# Patient Record
Sex: FEMALE | Race: WHITE | NOT HISPANIC OR LATINO | URBAN - METROPOLITAN AREA
[De-identification: names, ages, dates, MRNs, and addresses within clinical notes are randomized per-mention and may not be internally consistent; named-entity substitution may affect disease eponyms.]

---

## 2020-09-08 ENCOUNTER — CLAIMS CREATED FROM THE CLAIM WINDOW (OUTPATIENT)
Dept: URBAN - METROPOLITAN AREA CLINIC 94 | Facility: CLINIC | Age: 30
End: 2020-09-08
Payer: COMMERCIAL

## 2020-09-08 ENCOUNTER — LAB OUTSIDE AN ENCOUNTER (OUTPATIENT)
Dept: URBAN - METROPOLITAN AREA CLINIC 94 | Facility: CLINIC | Age: 30
End: 2020-09-08

## 2020-09-08 ENCOUNTER — WEB ENCOUNTER (OUTPATIENT)
Dept: URBAN - METROPOLITAN AREA CLINIC 94 | Facility: CLINIC | Age: 30
End: 2020-09-08

## 2020-09-08 ENCOUNTER — DASHBOARD ENCOUNTERS (OUTPATIENT)
Age: 30
End: 2020-09-08

## 2020-09-08 DIAGNOSIS — K51.90 ULCERATIVE COLITIS: ICD-10-CM

## 2020-09-08 PROCEDURE — G8427 DOCREV CUR MEDS BY ELIG CLIN: HCPCS | Performed by: INTERNAL MEDICINE

## 2020-09-08 PROCEDURE — 99213 OFFICE O/P EST LOW 20 MIN: CPT | Performed by: INTERNAL MEDICINE

## 2020-09-08 PROCEDURE — G8420 CALC BMI NORM PARAMETERS: HCPCS | Performed by: INTERNAL MEDICINE

## 2020-09-08 PROCEDURE — G9903 PT SCRN TBCO ID AS NON USER: HCPCS | Performed by: INTERNAL MEDICINE

## 2020-09-08 RX ORDER — MESALAMINE 1.2 G/1
2 TABLETS TABLET, DELAYED RELEASE ORAL ONCE A DAY
Qty: 180 TABLET | Refills: 3
Start: 2019-08-12 | End: 2020-08-06

## 2020-09-08 RX ORDER — MERCAPTOPURINE 50 MG/1
TAKE 1 TABLET BY ORAL ROUTE DAILY FOR 90 DAYS TABLET ORAL 1
Qty: 90 | Refills: 3 | Status: ACTIVE | COMMUNITY
Start: 2019-09-19 | End: 2020-09-13

## 2020-09-08 RX ORDER — ERGOCALCIFEROL 1.25 MG/1
CAPSULE ORAL
Qty: 0 | Refills: 0 | Status: ACTIVE | COMMUNITY
Start: 1900-01-01

## 2020-09-08 RX ORDER — MERCAPTOPURINE 50 MG/1
TAKE 1 TABLET BY ORAL ROUTE DAILY FOR 90 DAYS TABLET ORAL 1
Qty: 90 | Refills: 3
Start: 2019-09-19

## 2020-09-08 RX ORDER — MESALAMINE 1.2 G/1
TAKE 4 TABLETS BY ORAL ROUTE DAILY FOR 90 DAYS TABLET, DELAYED RELEASE ORAL 1
Qty: 360 | Refills: 4 | Status: ACTIVE | COMMUNITY
Start: 2019-08-12 | End: 2020-11-04

## 2020-09-08 NOTE — HPI-TODAY'S VISIT:
Pt has chronic UC. UC in remission on lialda and 6MP. Pt denies any GI symptoms such as diarrhea, rectal bleeding or abdominal pain.

## 2020-09-08 NOTE — PHYSICAL EXAM EYES:
Conjuntivae and eyelids appear normal, Sclerae : White without injection Normal rate, regular rhythm.  Heart sounds S1, S2.  No murmurs, rubs or gallops.

## 2020-09-09 LAB
A/G RATIO: 1.9
ALBUMIN: 4.7
ALKALINE PHOSPHATASE: 57
ALT (SGPT): 9
AST (SGOT): 20
BASO (ABSOLUTE): 0
BASOS: 0
BILIRUBIN, TOTAL: 0.2
BUN/CREATININE RATIO: 14
BUN: 10
CALCIUM: 9.2
CARBON DIOXIDE, TOTAL: 21
CHLORIDE: 101
CREATININE: 0.73
EGFR IF AFRICN AM: 126
EGFR IF NONAFRICN AM: 109
EOS (ABSOLUTE): 0.2
EOS: 2
GLOBULIN, TOTAL: 2.5
GLUCOSE: 89
HEMATOCRIT: 39.4
HEMATOLOGY COMMENTS:: (no result)
HEMOGLOBIN: 12.8
IMMATURE CELLS: (no result)
IMMATURE GRANS (ABS): 0
IMMATURE GRANULOCYTES: 1
LIPASE: 42
LYMPHS (ABSOLUTE): 1.3
LYMPHS: 18
MCH: 31.1
MCHC: 32.5
MCV: 96
MONOCYTES(ABSOLUTE): 0.4
MONOCYTES: 5
NEUTROPHILS (ABSOLUTE): 5.3
NEUTROPHILS: 74
NRBC: (no result)
PLATELETS: 201
POTASSIUM: 4.1
PROTEIN, TOTAL: 7.2
RBC: 4.11
RDW: 13.2
SODIUM: 140
WBC: 7.2

## 2021-03-19 ENCOUNTER — TELEPHONE ENCOUNTER (OUTPATIENT)
Dept: URBAN - METROPOLITAN AREA CLINIC 94 | Facility: CLINIC | Age: 31
End: 2021-03-19

## 2021-03-19 ENCOUNTER — LAB OUTSIDE AN ENCOUNTER (OUTPATIENT)
Dept: URBAN - METROPOLITAN AREA CLINIC 94 | Facility: CLINIC | Age: 31
End: 2021-03-19

## 2021-03-27 LAB
A/G RATIO: 1.9
ALBUMIN: 4.6
ALKALINE PHOSPHATASE: 49
ALT (SGPT): 7
AST (SGOT): 17
BASO (ABSOLUTE): 0
BASOS: 1
BILIRUBIN, TOTAL: 0.3
BUN/CREATININE RATIO: 13
BUN: 9
C-REACTIVE PROTEIN, QUANT: 2
CALCIUM: 9.5
CARBON DIOXIDE, TOTAL: 23
CHLORIDE: 101
CREATININE: 0.7
EGFR IF AFRICN AM: 133
EGFR IF NONAFRICN AM: 115
EOS (ABSOLUTE): 0.1
EOS: 2
GLOBULIN, TOTAL: 2.4
GLUCOSE: 91
HEMATOCRIT: 35.9
HEMATOLOGY COMMENTS:: (no result)
HEMOGLOBIN: 11.9
IMMATURE CELLS: (no result)
IMMATURE GRANS (ABS): 0
IMMATURE GRANULOCYTES: 0
LYMPHS (ABSOLUTE): 1.1
LYMPHS: 20
MCH: 31.2
MCHC: 33.1
MCV: 94
MONOCYTES(ABSOLUTE): 0.3
MONOCYTES: 6
NEUTROPHILS (ABSOLUTE): 3.9
NEUTROPHILS: 71
NRBC: (no result)
PLATELETS: 204
POTASSIUM: 3.7
PROTEIN, TOTAL: 7
RBC: 3.81
RDW: 13.5
SODIUM: 139
WBC: 5.4

## 2021-09-09 ENCOUNTER — OFFICE VISIT (OUTPATIENT)
Dept: URBAN - METROPOLITAN AREA CLINIC 94 | Facility: CLINIC | Age: 31
End: 2021-09-09

## 2021-12-01 ENCOUNTER — ERX REFILL RESPONSE (OUTPATIENT)
Dept: URBAN - METROPOLITAN AREA CLINIC 52 | Facility: CLINIC | Age: 31
End: 2021-12-01

## 2021-12-01 RX ORDER — MESALAMINE 1.2 G/1
TAKE 2 TABLETS BY MOUTH EVERY DAY TABLET, DELAYED RELEASE ORAL
Qty: 180 TABLET | Refills: 0 | OUTPATIENT

## 2021-12-01 RX ORDER — MERCAPTOPURINE 50 MG/1
TAKE 1 TABLET BY MOUTH EVERY DAY TABLET ORAL
Qty: 90 TABLET | Refills: 1 | OUTPATIENT

## 2021-12-01 RX ORDER — MERCAPTOPURINE 50 MG/1
TAKE 1 TABLET BY MOUTH EVERY DAY TABLET ORAL
Qty: 90 TABLET | Refills: 0 | OUTPATIENT

## 2021-12-01 RX ORDER — MESALAMINE 1.2 G/1
TAKE 2 TABLETS BY MOUTH EVERY DAY TABLET, DELAYED RELEASE ORAL
Qty: 180 TABLET | Refills: 1 | OUTPATIENT

## 2022-02-28 ENCOUNTER — ERX REFILL RESPONSE (OUTPATIENT)
Dept: URBAN - METROPOLITAN AREA CLINIC 52 | Facility: CLINIC | Age: 32
End: 2022-02-28

## 2022-02-28 RX ORDER — MESALAMINE 1.2 G/1
TAKE 2 TABLETS BY MOUTH EVERY DAY TABLET, DELAYED RELEASE ORAL
Qty: 180 TABLET | Refills: 1 | OUTPATIENT

## 2022-04-04 ENCOUNTER — ERX REFILL RESPONSE (OUTPATIENT)
Dept: URBAN - METROPOLITAN AREA CLINIC 52 | Facility: CLINIC | Age: 32
End: 2022-04-04

## 2022-04-04 RX ORDER — MERCAPTOPURINE 50 MG/1
TAKE 1 TABLET BY MOUTH EVERY DAY TABLET ORAL
Qty: 90 TABLET | Refills: 1 | OUTPATIENT

## 2022-05-18 ENCOUNTER — ERX REFILL RESPONSE (OUTPATIENT)
Dept: URBAN - METROPOLITAN AREA CLINIC 52 | Facility: CLINIC | Age: 32
End: 2022-05-18

## 2022-05-18 RX ORDER — MESALAMINE 1.2 G/1
TAKE 2 TABLETS BY MOUTH EVERY DAY TABLET, DELAYED RELEASE ORAL
Qty: 180 TABLET | Refills: 1 | OUTPATIENT

## 2022-07-05 ENCOUNTER — ERX REFILL RESPONSE (OUTPATIENT)
Dept: URBAN - METROPOLITAN AREA CLINIC 52 | Facility: CLINIC | Age: 32
End: 2022-07-05

## 2022-07-05 RX ORDER — MERCAPTOPURINE 50 MG/1
TAKE 1 TABLET BY MOUTH EVERY DAY TABLET ORAL
Qty: 90 TABLET | Refills: 1 | OUTPATIENT

## 2022-07-05 RX ORDER — MERCAPTOPURINE 50 MG/1
TAKE 1 TABLET BY MOUTH EVERY DAY TABLET ORAL
Qty: 90 TABLET | Refills: 0 | OUTPATIENT